# Patient Record
Sex: FEMALE
[De-identification: names, ages, dates, MRNs, and addresses within clinical notes are randomized per-mention and may not be internally consistent; named-entity substitution may affect disease eponyms.]

---

## 2024-06-06 ENCOUNTER — RX ONLY (OUTPATIENT)
Age: 24
Setting detail: RX ONLY
End: 2024-06-06

## 2024-06-22 ENCOUNTER — APPOINTMENT (RX ONLY)
Dept: URBAN - METROPOLITAN AREA CLINIC 142 | Facility: CLINIC | Age: 24
Setting detail: DERMATOLOGY
End: 2024-06-22

## 2024-06-22 DIAGNOSIS — L65.9 NONSCARRING HAIR LOSS, UNSPECIFIED: ICD-10-CM

## 2024-06-22 PROCEDURE — ? ADDITIONAL NOTES

## 2024-06-22 PROCEDURE — ? COUNSELING

## 2024-06-22 PROCEDURE — 99204 OFFICE O/P NEW MOD 45 MIN: CPT

## 2024-06-22 ASSESSMENT — LOCATION ZONE DERM: LOCATION ZONE: FACE

## 2024-06-22 ASSESSMENT — LOCATION DETAILED DESCRIPTION DERM: LOCATION DETAILED: RIGHT SUPERIOR MEDIAL FOREHEAD

## 2024-06-22 ASSESSMENT — LOCATION SIMPLE DESCRIPTION DERM: LOCATION SIMPLE: RIGHT FOREHEAD

## 2024-06-22 ASSESSMENT — SEVERITY ASSESSMENT OVERALL AMONG ALL PATIENTS
IN YOUR EXPERIENCE, AMONG ALL PATIENTS YOU HAVE SEEN WITH THIS CONDITION, HOW SEVERE IS THIS PATIENT'S CONDITION?: S1 (LESS THAN 25% HAIR LOSS)

## 2024-06-22 NOTE — PROCEDURE: MIPS QUALITY
Quality 226: Preventive Care And Screening: Tobacco Use: Screening And Cessation Intervention: Patient screened for tobacco use and is an ex/non-smoker
Detail Level: Detailed
0

## 2024-06-22 NOTE — HPI: HAIR LOSS
How Did The Hair Loss Occur?: sudden in onset
Additional History: Patient states about 2-3 months she started to notice a bald spot but recently she’s started to notice slight hair growth with improvement in the area of her concern on the frontal scalp part-line.

## 2024-06-22 NOTE — PROCEDURE: ADDITIONAL NOTES
Render Risk Assessment In Note?: no
Detail Level: Simple
Additional Notes: Differential diagnosis includes focal androgenetic alopecia or resolving telogen effluvium or less likely trichotillomania. No evidence of alopecia areata or lichen planopilaris or other scarring alopecia today. 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 \\n\\nDiscussed benefits, possible side effects and alternatives of treatment with topical minoxidil 5% foam, oral minoxidil (would treat 0.625 mg - 1.25 mg daily) vs observation with the patient. After discussion, patient elected to continue to observe without medication initiation today.  \\n\\nAgreed at this time we will monitor and reevaluate if worsens.